# Patient Record
Sex: FEMALE | Race: WHITE | NOT HISPANIC OR LATINO | URBAN - METROPOLITAN AREA
[De-identification: names, ages, dates, MRNs, and addresses within clinical notes are randomized per-mention and may not be internally consistent; named-entity substitution may affect disease eponyms.]

---

## 2018-07-26 ENCOUNTER — OFFICE (OUTPATIENT)
Dept: URBAN - METROPOLITAN AREA CLINIC 101 | Facility: CLINIC | Age: 78
End: 2018-07-26

## 2018-07-26 VITALS
HEART RATE: 61 BPM | TEMPERATURE: 97.9 F | HEIGHT: 63 IN | WEIGHT: 179 LBS | DIASTOLIC BLOOD PRESSURE: 55 MMHG | SYSTOLIC BLOOD PRESSURE: 118 MMHG

## 2018-07-26 DIAGNOSIS — K59.1 FUNCTIONAL DIARRHEA: ICD-10-CM

## 2018-07-26 DIAGNOSIS — R63.3 FEEDING DIFFICULTIES: ICD-10-CM

## 2018-07-26 DIAGNOSIS — K94.23 GASTROSTOMY MALFUNCTION: ICD-10-CM

## 2018-07-26 PROCEDURE — 99214 OFFICE O/P EST MOD 30 MIN: CPT

## 2018-07-26 RX ORDER — DIPHENOXYLATE HYDROCHLORIDE AND ATROPINE SULFATE 2.5; .025 MG/1; MG/1
TABLET ORAL
Qty: 45 | Refills: 1 | Status: COMPLETED
Start: 2018-07-26 | End: 2018-10-23

## 2018-08-28 ENCOUNTER — OFFICE (OUTPATIENT)
Dept: URBAN - METROPOLITAN AREA CLINIC 33 | Facility: CLINIC | Age: 78
End: 2018-08-28

## 2018-08-28 VITALS
DIASTOLIC BLOOD PRESSURE: 49 MMHG | HEART RATE: 80 BPM | HEIGHT: 63 IN | TEMPERATURE: 97.7 F | SYSTOLIC BLOOD PRESSURE: 100 MMHG | WEIGHT: 172 LBS

## 2018-08-28 DIAGNOSIS — K59.1 FUNCTIONAL DIARRHEA: ICD-10-CM

## 2018-08-28 PROCEDURE — 99213 OFFICE O/P EST LOW 20 MIN: CPT

## 2018-10-23 ENCOUNTER — OFFICE (OUTPATIENT)
Dept: URBAN - METROPOLITAN AREA CLINIC 33 | Facility: CLINIC | Age: 78
End: 2018-10-23

## 2018-10-23 VITALS
WEIGHT: 166 LBS | HEART RATE: 83 BPM | HEIGHT: 63 IN | SYSTOLIC BLOOD PRESSURE: 98 MMHG | DIASTOLIC BLOOD PRESSURE: 47 MMHG | TEMPERATURE: 97.5 F

## 2018-10-23 DIAGNOSIS — K74.60 UNSPECIFIED CIRRHOSIS OF LIVER: ICD-10-CM

## 2018-10-23 DIAGNOSIS — D69.6 THROMBOCYTOPENIA, UNSPECIFIED: ICD-10-CM

## 2018-10-23 DIAGNOSIS — D64.9 ANEMIA, UNSPECIFIED: ICD-10-CM

## 2018-10-23 DIAGNOSIS — K72.90 HEPATIC FAILURE, UNSPECIFIED WITHOUT COMA: ICD-10-CM

## 2018-10-23 DIAGNOSIS — K92.1 MELENA: ICD-10-CM

## 2018-10-23 DIAGNOSIS — R18.8 OTHER ASCITES: ICD-10-CM

## 2018-10-23 DIAGNOSIS — K76.0 FATTY (CHANGE OF) LIVER, NOT ELSEWHERE CLASSIFIED: ICD-10-CM

## 2018-10-23 PROCEDURE — 99214 OFFICE O/P EST MOD 30 MIN: CPT

## 2018-11-14 LAB
COMPREHENSIVE METABOLIC PROF: ALANINE AMINOTRANS (ALT/GPT): 30 IU/L
COMPREHENSIVE METABOLIC PROF: ALBUMIN: 3.1 GM/DL — LOW
COMPREHENSIVE METABOLIC PROF: ALK PHOS,TOTAL: 127 IU/L
COMPREHENSIVE METABOLIC PROF: ANION GAP: 11
COMPREHENSIVE METABOLIC PROF: AST/SGOT ASPARTATE AMINO TRANS: 37 U/L
COMPREHENSIVE METABOLIC PROF: BILIRUBIN,TOTAL: 1.4 MG/DL — HIGH
COMPREHENSIVE METABOLIC PROF: BLOOD UREA NITROGEN: 25 MG/DL — HIGH
COMPREHENSIVE METABOLIC PROF: CALCIUM LEVEL: 8.5 MG/DL
COMPREHENSIVE METABOLIC PROF: CARBON DIOXIDE: 27 MEQ/L
COMPREHENSIVE METABOLIC PROF: CHLORIDE LEVEL: 104 MEQ/L
COMPREHENSIVE METABOLIC PROF: CREATININE,SERUM: 1.2 MG/DL
COMPREHENSIVE METABOLIC PROF: GLOMERULAR FILTRATION RATE: 43 ML/MIN
COMPREHENSIVE METABOLIC PROF: GLUCOSE,RANDOM: 156 MG/DL — HIGH
COMPREHENSIVE METABOLIC PROF: PERFORMING LOCATION: (no result)
COMPREHENSIVE METABOLIC PROF: POTASSIUM LEVEL: 3.6 MEQ/L
COMPREHENSIVE METABOLIC PROF: SODIUM LEVEL, SERUM: 142 MEQ/L
COMPREHENSIVE METABOLIC PROF: TOTAL PROTEIN: 7.1 GM/DL
PROTHROMBIN TIME  PT/ INR: INR: 1.25
PROTHROMBIN TIME  PT/ INR: PROTHROMBIN TIME (PATIENT): 13 SECONDS — HIGH

## 2018-12-06 ENCOUNTER — ON CAMPUS - OUTPATIENT (OUTPATIENT)
Dept: URBAN - METROPOLITAN AREA HOSPITAL 14 | Facility: HOSPITAL | Age: 78
End: 2018-12-06

## 2018-12-06 DIAGNOSIS — I85.00 ESOPHAGEAL VARICES WITHOUT BLEEDING: ICD-10-CM

## 2018-12-06 DIAGNOSIS — K74.60 UNSPECIFIED CIRRHOSIS OF LIVER: ICD-10-CM

## 2018-12-06 DIAGNOSIS — D64.9 ANEMIA, UNSPECIFIED: ICD-10-CM

## 2018-12-06 DIAGNOSIS — K76.6 PORTAL HYPERTENSION: ICD-10-CM

## 2018-12-06 DIAGNOSIS — K92.1 MELENA: ICD-10-CM

## 2018-12-06 PROCEDURE — 43235 EGD DIAGNOSTIC BRUSH WASH: CPT

## 2018-12-14 PROBLEM — K29.01 GASTRITIS: Status: ACTIVE | Noted: 2018-12-13

## 2019-01-31 ENCOUNTER — ON CAMPUS - OUTPATIENT (OUTPATIENT)
Dept: URBAN - METROPOLITAN AREA HOSPITAL 35 | Facility: HOSPITAL | Age: 79
End: 2019-01-31

## 2019-01-31 DIAGNOSIS — K75.81 NONALCOHOLIC STEATOHEPATITIS (NASH): ICD-10-CM

## 2019-01-31 DIAGNOSIS — I85.00 ESOPHAGEAL VARICES WITHOUT BLEEDING: ICD-10-CM

## 2019-01-31 DIAGNOSIS — K76.6 PORTAL HYPERTENSION: ICD-10-CM

## 2019-01-31 DIAGNOSIS — K74.60 UNSPECIFIED CIRRHOSIS OF LIVER: ICD-10-CM

## 2019-01-31 PROCEDURE — 43244 EGD VARICES LIGATION: CPT

## 2019-05-10 ENCOUNTER — OFFICE (OUTPATIENT)
Dept: URBAN - METROPOLITAN AREA CLINIC 101 | Facility: CLINIC | Age: 79
End: 2019-05-10

## 2019-05-10 VITALS
DIASTOLIC BLOOD PRESSURE: 64 MMHG | HEART RATE: 73 BPM | HEIGHT: 63 IN | TEMPERATURE: 97.7 F | WEIGHT: 158 LBS | SYSTOLIC BLOOD PRESSURE: 113 MMHG

## 2019-05-10 DIAGNOSIS — K74.60 UNSPECIFIED CIRRHOSIS OF LIVER: ICD-10-CM

## 2019-05-10 DIAGNOSIS — C22.0 LIVER CELL CARCINOMA: ICD-10-CM

## 2019-05-10 DIAGNOSIS — R18.8 OTHER ASCITES: ICD-10-CM

## 2019-05-10 DIAGNOSIS — I85.00 ESOPHAGEAL VARICES WITHOUT BLEEDING: ICD-10-CM

## 2019-05-10 DIAGNOSIS — D64.9 ANEMIA, UNSPECIFIED: ICD-10-CM

## 2019-05-10 PROCEDURE — 99215 OFFICE O/P EST HI 40 MIN: CPT

## 2019-05-10 NOTE — SERVICEHPINOTES
BARBARA HUME   is a   78  female with history of NIEVES, cirrhosis of liver and HCC presents with bloating symptoms. Previously seen by Dr. Maria. Seen by liver transplant committee at [facility], not a liver transplant candidate due to end stage disease not amendable to correction. S/P TACE on 03/14/19.BRHad a blood transfusion on 03/15/19 with hx of anemia.  Will follow up with [facility] on 05/17/19. BRHad and EGD 01/31/19 by Dr. Maria. Esophageal varies were seen s/p banding, portal hypertension.  Recommended repeat EGD in 4-6 weeks.  BRHx of AS, had a procedure, sounds like valve dilation in Feb prior to the TACE. Currently being monitored  by Dr. keene, cardiologist.  Reports increased bloating. Has been taking Lasix 40 mg and spironolactone 50 mg. BR+ Weight gain 10 lbs since 6 weeks. No previous parenthesis. BRMostly moves bowel up to 3 times daily on BSS type 6. Takes lactulose once daily in AM, not very good with taking the following dosage. BRSees some blood after wipe occasionally. Hx of hemorrhoids. last colonoscopy was done in 04/2015, one adenomatous polyp, non bleeding AVM and hemorrhoids. She was advised to repeat it in 5 years. Mother had a hx of colon cancer at age 58. BR

## 2019-06-14 ENCOUNTER — OFFICE (OUTPATIENT)
Dept: URBAN - METROPOLITAN AREA CLINIC 101 | Facility: CLINIC | Age: 79
End: 2019-06-14

## 2019-06-14 VITALS
WEIGHT: 163 LBS | HEART RATE: 67 BPM | TEMPERATURE: 97.9 F | DIASTOLIC BLOOD PRESSURE: 57 MMHG | SYSTOLIC BLOOD PRESSURE: 132 MMHG | HEIGHT: 63 IN

## 2019-06-14 DIAGNOSIS — C22.0 LIVER CELL CARCINOMA: ICD-10-CM

## 2019-06-14 DIAGNOSIS — I85.00 ESOPHAGEAL VARICES WITHOUT BLEEDING: ICD-10-CM

## 2019-06-14 DIAGNOSIS — K74.60 UNSPECIFIED CIRRHOSIS OF LIVER: ICD-10-CM

## 2019-06-14 DIAGNOSIS — R19.7 DIARRHEA, UNSPECIFIED: ICD-10-CM

## 2019-06-14 DIAGNOSIS — R18.8 OTHER ASCITES: ICD-10-CM

## 2019-06-14 DIAGNOSIS — R06.00 DYSPNEA, UNSPECIFIED: ICD-10-CM

## 2019-06-14 PROCEDURE — 99215 OFFICE O/P EST HI 40 MIN: CPT

## 2019-06-14 NOTE — INTERFACERESULTNOTES
Test results mailed and also relayed to patient over phone and I also told her she can take 2 Imodium tablets for diarrhea. Patient stated that her diarrhea has improved a lot.

## 2019-06-14 NOTE — SERVICEHPINOTES
BARBARA HUME   is a   78  female with hx of HCC s/p TACE with underlying NIEVES, cirrhosis of liver, esophageal varices, aortic stenosis presents with diarrhea. Moves bowel up to 8 times on BSS type 7. + occasional nocturnal diarrhea. Denies recent antibiotic use or sick person contact. BRGetting medication through pillpack. Concerned about PPI use due to SE including diarrhea. Takes Lactulose once daily. BRBlood in stool after wipe which has gotten worse lately. Hx of hemorrhoids, occasional flare up. + discharge with odor. BRLast colonoscopy was done in 04/2015, TA, non bleeding AVM and internal hemorrhoids. BRDenies nausea, vomiting, acid reflux. Occasional epigsatric pain especially at night. Recent MRI ordered by [facility]. Not able to do with IV due to poor vein. Will follow up with [facility].  BRCurrently on spironolactone 50 mg BID daily. Used to take Lasix 40 mg but D/C'd at least 4 months ago. Weight gain 5 lbs since May. + SOB. BRTakes Coreg 3.125 mg BID. BREGD scheduled for July for esophageal varices. Lindsey have EKG in July by cardiologist, Dr. Iyer. Hx of dilation of heart valve prior to TACE in March 2014. CALLIE

## 2019-06-18 LAB — C DIFFICILE TOXIN GENE NAA: NEGATIVE

## 2019-09-13 ENCOUNTER — OFFICE (OUTPATIENT)
Dept: URBAN - METROPOLITAN AREA CLINIC 33 | Facility: CLINIC | Age: 79
End: 2019-09-13

## 2019-09-13 VITALS
TEMPERATURE: 97.4 F | HEART RATE: 74 BPM | DIASTOLIC BLOOD PRESSURE: 53 MMHG | SYSTOLIC BLOOD PRESSURE: 111 MMHG | HEIGHT: 63 IN | WEIGHT: 159 LBS

## 2019-09-13 DIAGNOSIS — N28.9 DISORDER OF KIDNEY AND URETER, UNSPECIFIED: ICD-10-CM

## 2019-09-13 DIAGNOSIS — K74.60 UNSPECIFIED CIRRHOSIS OF LIVER: ICD-10-CM

## 2019-09-13 DIAGNOSIS — I35.0 NONRHEUMATIC AORTIC (VALVE) STENOSIS: ICD-10-CM

## 2019-09-13 DIAGNOSIS — E11.9 TYPE 2 DIABETES MELLITUS WITHOUT COMPLICATIONS: ICD-10-CM

## 2019-09-13 DIAGNOSIS — D50.9 IRON DEFICIENCY ANEMIA, UNSPECIFIED: ICD-10-CM

## 2019-09-13 PROCEDURE — 99214 OFFICE O/P EST MOD 30 MIN: CPT

## 2019-10-08 LAB — AFP, SERUM, TUMOR MARKER: 2.9 NG/ML (ref 0–8.3)

## 2019-12-09 ENCOUNTER — ON CAMPUS - OUTPATIENT (OUTPATIENT)
Dept: URBAN - METROPOLITAN AREA HOSPITAL 35 | Facility: HOSPITAL | Age: 79
End: 2019-12-09

## 2019-12-09 DIAGNOSIS — K31.89 OTHER DISEASES OF STOMACH AND DUODENUM: ICD-10-CM

## 2019-12-09 DIAGNOSIS — K29.80 DUODENITIS WITHOUT BLEEDING: ICD-10-CM

## 2019-12-09 DIAGNOSIS — K20.8 OTHER ESOPHAGITIS: ICD-10-CM

## 2019-12-09 DIAGNOSIS — K76.6 PORTAL HYPERTENSION: ICD-10-CM

## 2019-12-09 DIAGNOSIS — I85.10 SECONDARY ESOPHAGEAL VARICES WITHOUT BLEEDING: ICD-10-CM

## 2019-12-09 PROCEDURE — 43244 EGD VARICES LIGATION: CPT

## 2020-01-02 ENCOUNTER — ON CAMPUS - OUTPATIENT (OUTPATIENT)
Dept: URBAN - METROPOLITAN AREA HOSPITAL 35 | Facility: HOSPITAL | Age: 80
End: 2020-01-02

## 2020-01-02 DIAGNOSIS — K76.6 PORTAL HYPERTENSION: ICD-10-CM

## 2020-01-02 DIAGNOSIS — K31.89 OTHER DISEASES OF STOMACH AND DUODENUM: ICD-10-CM

## 2020-01-02 DIAGNOSIS — K85.10 BILIARY ACUTE PANCREATITIS WITHOUT NECROSIS OR INFECTION: ICD-10-CM

## 2020-01-02 DIAGNOSIS — K21.0 GASTRO-ESOPHAGEAL REFLUX DISEASE WITH ESOPHAGITIS: ICD-10-CM

## 2020-01-02 PROCEDURE — 43235 EGD DIAGNOSTIC BRUSH WASH: CPT

## 2020-01-06 ENCOUNTER — INPATIENT HOSPITAL (OUTPATIENT)
Dept: URBAN - METROPOLITAN AREA HOSPITAL 32 | Facility: HOSPITAL | Age: 80
End: 2020-01-06

## 2020-01-06 DIAGNOSIS — K62.5 HEMORRHAGE OF ANUS AND RECTUM: ICD-10-CM

## 2020-01-06 DIAGNOSIS — D69.6 THROMBOCYTOPENIA, UNSPECIFIED: ICD-10-CM

## 2020-01-06 DIAGNOSIS — D50.9 IRON DEFICIENCY ANEMIA, UNSPECIFIED: ICD-10-CM

## 2020-01-06 DIAGNOSIS — K74.60 UNSPECIFIED CIRRHOSIS OF LIVER: ICD-10-CM

## 2020-01-06 PROCEDURE — 99222 1ST HOSP IP/OBS MODERATE 55: CPT

## 2020-01-07 ENCOUNTER — INPATIENT HOSPITAL (OUTPATIENT)
Dept: URBAN - METROPOLITAN AREA HOSPITAL 32 | Facility: HOSPITAL | Age: 80
End: 2020-01-07
Payer: COMMERCIAL

## 2020-01-07 DIAGNOSIS — K55.21 ANGIODYSPLASIA OF COLON WITH HEMORRHAGE: ICD-10-CM

## 2020-01-07 DIAGNOSIS — K92.2 GASTROINTESTINAL HEMORRHAGE, UNSPECIFIED: ICD-10-CM

## 2020-01-07 DIAGNOSIS — K62.5 HEMORRHAGE OF ANUS AND RECTUM: ICD-10-CM

## 2020-01-07 DIAGNOSIS — D12.2 BENIGN NEOPLASM OF ASCENDING COLON: ICD-10-CM

## 2020-01-07 DIAGNOSIS — D69.6 THROMBOCYTOPENIA, UNSPECIFIED: ICD-10-CM

## 2020-01-07 DIAGNOSIS — D50.9 IRON DEFICIENCY ANEMIA, UNSPECIFIED: ICD-10-CM

## 2020-01-07 DIAGNOSIS — K74.60 UNSPECIFIED CIRRHOSIS OF LIVER: ICD-10-CM

## 2020-01-07 PROCEDURE — 45388 COLONOSCOPY W/ABLATION: CPT

## 2020-01-07 PROCEDURE — 45385 COLONOSCOPY W/LESION REMOVAL: CPT

## 2020-01-08 ENCOUNTER — INPATIENT HOSPITAL (OUTPATIENT)
Dept: URBAN - METROPOLITAN AREA HOSPITAL 32 | Facility: HOSPITAL | Age: 80
End: 2020-01-08

## 2020-01-08 DIAGNOSIS — D69.6 THROMBOCYTOPENIA, UNSPECIFIED: ICD-10-CM

## 2020-01-08 DIAGNOSIS — K92.2 GASTROINTESTINAL HEMORRHAGE, UNSPECIFIED: ICD-10-CM

## 2020-01-08 DIAGNOSIS — K55.21 ANGIODYSPLASIA OF COLON WITH HEMORRHAGE: ICD-10-CM

## 2020-01-08 DIAGNOSIS — D12.2 BENIGN NEOPLASM OF ASCENDING COLON: ICD-10-CM

## 2020-01-08 DIAGNOSIS — K62.5 HEMORRHAGE OF ANUS AND RECTUM: ICD-10-CM

## 2020-01-08 DIAGNOSIS — K74.60 UNSPECIFIED CIRRHOSIS OF LIVER: ICD-10-CM

## 2020-01-08 DIAGNOSIS — D50.9 IRON DEFICIENCY ANEMIA, UNSPECIFIED: ICD-10-CM

## 2020-01-08 PROCEDURE — 99232 SBSQ HOSP IP/OBS MODERATE 35: CPT

## 2020-01-27 ENCOUNTER — OFFICE (OUTPATIENT)
Dept: URBAN - METROPOLITAN AREA CLINIC 101 | Facility: CLINIC | Age: 80
End: 2020-01-27

## 2020-01-27 VITALS
HEIGHT: 63 IN | TEMPERATURE: 97.1 F | SYSTOLIC BLOOD PRESSURE: 142 MMHG | DIASTOLIC BLOOD PRESSURE: 56 MMHG | HEART RATE: 73 BPM | WEIGHT: 157 LBS

## 2020-01-27 DIAGNOSIS — K74.60 UNSPECIFIED CIRRHOSIS OF LIVER: ICD-10-CM

## 2020-01-27 DIAGNOSIS — K72.90 HEPATIC FAILURE, UNSPECIFIED WITHOUT COMA: ICD-10-CM

## 2020-01-27 DIAGNOSIS — Z86.010 PERSONAL HISTORY OF COLONIC POLYPS: ICD-10-CM

## 2020-01-27 DIAGNOSIS — K59.1 FUNCTIONAL DIARRHEA: ICD-10-CM

## 2020-01-27 DIAGNOSIS — I85.00 ESOPHAGEAL VARICES WITHOUT BLEEDING: ICD-10-CM

## 2020-01-27 DIAGNOSIS — C22.0 LIVER CELL CARCINOMA: ICD-10-CM

## 2020-01-27 PROCEDURE — 99215 OFFICE O/P EST HI 40 MIN: CPT

## 2020-01-27 NOTE — SERVICEHPINOTES
BARBARA HUME   is a   79  female who presents for follow up. Occasional nausea without vomiting. Denies dysphagia or acid reflux. Denies significant weight gain or loss.  BRBeen taking Nexium 20 mg daily since the last EGD on 01/02/20.  BROccasional burping. Occasional upper abdominal pain that radiates to the back. BRHas not been seen by [facility] since July, had an MRI in 08/2019. BROccasional trouble breathing, not sure if due to acid reflux.  BRMoves bowel up to 2-8 times daily. + chronic diarrhea. Occasional hemorrhoid flare up, seen a small amount of blood after wipe.     BRMost recent blood work from Dr. Dangelo, last week.Repeat EGD scheduled for Feb. S/p TAVR procedure in October by Dr. Lama. BRSees Dr. Iyer. BR

## 2020-01-29 LAB — PANCREATIC ELASTASE, FECAL: >500 UG ELAST./G

## 2020-02-18 ENCOUNTER — ON CAMPUS - OUTPATIENT (OUTPATIENT)
Dept: URBAN - METROPOLITAN AREA HOSPITAL 35 | Facility: HOSPITAL | Age: 80
End: 2020-02-18

## 2020-02-18 DIAGNOSIS — K76.6 PORTAL HYPERTENSION: ICD-10-CM

## 2020-02-18 DIAGNOSIS — K31.89 OTHER DISEASES OF STOMACH AND DUODENUM: ICD-10-CM

## 2020-02-18 DIAGNOSIS — I85.10 SECONDARY ESOPHAGEAL VARICES WITHOUT BLEEDING: ICD-10-CM

## 2020-02-18 PROCEDURE — 43244 EGD VARICES LIGATION: CPT

## 2020-06-16 ENCOUNTER — INPATIENT HOSPITAL (OUTPATIENT)
Dept: URBAN - METROPOLITAN AREA HOSPITAL 34 | Facility: HOSPITAL | Age: 80
End: 2020-06-16
Payer: COMMERCIAL

## 2020-06-16 DIAGNOSIS — K74.60 UNSPECIFIED CIRRHOSIS OF LIVER: ICD-10-CM

## 2020-06-16 DIAGNOSIS — K76.6 PORTAL HYPERTENSION: ICD-10-CM

## 2020-06-16 DIAGNOSIS — K92.1 MELENA: ICD-10-CM

## 2020-06-16 DIAGNOSIS — D50.9 IRON DEFICIENCY ANEMIA, UNSPECIFIED: ICD-10-CM

## 2020-06-16 DIAGNOSIS — K72.90 HEPATIC FAILURE, UNSPECIFIED WITHOUT COMA: ICD-10-CM

## 2020-06-16 DIAGNOSIS — D69.6 THROMBOCYTOPENIA, UNSPECIFIED: ICD-10-CM

## 2020-06-16 DIAGNOSIS — I85.10 SECONDARY ESOPHAGEAL VARICES WITHOUT BLEEDING: ICD-10-CM

## 2020-06-16 PROCEDURE — 99222 1ST HOSP IP/OBS MODERATE 55: CPT | Performed by: INTERNAL MEDICINE

## 2020-06-17 PROCEDURE — 43235 EGD DIAGNOSTIC BRUSH WASH: CPT | Performed by: INTERNAL MEDICINE

## 2020-06-18 ENCOUNTER — INPATIENT HOSPITAL (OUTPATIENT)
Dept: URBAN - METROPOLITAN AREA HOSPITAL 34 | Facility: HOSPITAL | Age: 80
End: 2020-06-18
Payer: COMMERCIAL

## 2020-06-18 DIAGNOSIS — K76.6 PORTAL HYPERTENSION: ICD-10-CM

## 2020-06-18 DIAGNOSIS — I85.10 SECONDARY ESOPHAGEAL VARICES WITHOUT BLEEDING: ICD-10-CM

## 2020-06-18 DIAGNOSIS — K72.90 HEPATIC FAILURE, UNSPECIFIED WITHOUT COMA: ICD-10-CM

## 2020-06-18 DIAGNOSIS — D50.9 IRON DEFICIENCY ANEMIA, UNSPECIFIED: ICD-10-CM

## 2020-06-18 DIAGNOSIS — K74.60 UNSPECIFIED CIRRHOSIS OF LIVER: ICD-10-CM

## 2020-06-18 DIAGNOSIS — K92.1 MELENA: ICD-10-CM

## 2020-06-18 DIAGNOSIS — D69.6 THROMBOCYTOPENIA, UNSPECIFIED: ICD-10-CM

## 2020-06-18 PROCEDURE — 99232 SBSQ HOSP IP/OBS MODERATE 35: CPT | Performed by: NURSE PRACTITIONER

## 2020-08-07 ENCOUNTER — OFFICE (OUTPATIENT)
Dept: URBAN - METROPOLITAN AREA TELEHEALTH 12 | Facility: TELEHEALTH | Age: 80
End: 2020-08-07
Payer: COMMERCIAL

## 2020-08-07 VITALS — WEIGHT: 146 LBS | HEIGHT: 63 IN

## 2020-08-07 DIAGNOSIS — K74.60 UNSPECIFIED CIRRHOSIS OF LIVER: ICD-10-CM

## 2020-08-07 DIAGNOSIS — I85.00 ESOPHAGEAL VARICES WITHOUT BLEEDING: ICD-10-CM

## 2020-08-07 DIAGNOSIS — K59.1 FUNCTIONAL DIARRHEA: ICD-10-CM

## 2020-08-07 PROCEDURE — 99214 OFFICE O/P EST MOD 30 MIN: CPT | Mod: 95 | Performed by: INTERNAL MEDICINE

## 2020-08-07 NOTE — SERVICEHPINOTES
PATIENT VERIFIED BY DATE OF BIRTH AND NAME. Patient has been consented for this telecommunication visit. 79 female who presents for follow up. Occasional nausea without vomiting. Denies dysphagia or acid reflux. Denies significant weight gain or loss. Been taking Nexium 20 mg daily.  EGD in June revealed Grade I non-bleeding varices, no banding necessary.  Some loose stools since, but this resolved which she stopped Metformin and cut back on Lactulose.BR Kristyn 10 point review of systems have been reviewed as per HPI and otherwise negative.

## 2020-08-07 NOTE — SERVICENOTES
Patient's visit was conducted through video telecommunication. Patient consented before the start of visit as to understanding of privacy concerns, possible technological failure, and their responsibility of carrying out instructions of plan.
No

## 2020-11-10 ENCOUNTER — OFFICE (OUTPATIENT)
Dept: URBAN - METROPOLITAN AREA CLINIC 102 | Facility: CLINIC | Age: 80
End: 2020-11-10
Payer: COMMERCIAL

## 2020-11-10 VITALS
DIASTOLIC BLOOD PRESSURE: 72 MMHG | HEART RATE: 73 BPM | WEIGHT: 152 LBS | HEIGHT: 63 IN | SYSTOLIC BLOOD PRESSURE: 127 MMHG | TEMPERATURE: 97.9 F

## 2020-11-10 DIAGNOSIS — K74.60 UNSPECIFIED CIRRHOSIS OF LIVER: ICD-10-CM

## 2020-11-10 DIAGNOSIS — I85.00 ESOPHAGEAL VARICES WITHOUT BLEEDING: ICD-10-CM

## 2020-11-10 PROCEDURE — 99214 OFFICE O/P EST MOD 30 MIN: CPT | Performed by: INTERNAL MEDICINE

## 2020-11-17 PROBLEM — I85.00 ESOPHAGEAL VARICES WITHOUT BLEEDING: Status: ACTIVE | Noted: 2019-05-10

## 2020-12-23 ENCOUNTER — INPATIENT HOSPITAL (OUTPATIENT)
Dept: URBAN - METROPOLITAN AREA HOSPITAL 34 | Facility: HOSPITAL | Age: 80
End: 2020-12-23
Payer: COMMERCIAL

## 2020-12-23 DIAGNOSIS — K74.60 UNSPECIFIED CIRRHOSIS OF LIVER: ICD-10-CM

## 2020-12-23 DIAGNOSIS — D50.9 IRON DEFICIENCY ANEMIA, UNSPECIFIED: ICD-10-CM

## 2020-12-23 DIAGNOSIS — I85.10 SECONDARY ESOPHAGEAL VARICES WITHOUT BLEEDING: ICD-10-CM

## 2020-12-23 DIAGNOSIS — D69.6 THROMBOCYTOPENIA, UNSPECIFIED: ICD-10-CM

## 2020-12-23 PROCEDURE — 99222 1ST HOSP IP/OBS MODERATE 55: CPT | Performed by: INTERNAL MEDICINE

## 2021-01-01 ENCOUNTER — OFFICE (OUTPATIENT)
Dept: URBAN - METROPOLITAN AREA TELEHEALTH 3 | Facility: TELEHEALTH | Age: 81
End: 2021-01-01
Payer: COMMERCIAL

## 2021-01-01 VITALS — HEIGHT: 63 IN | WEIGHT: 162 LBS

## 2021-01-01 DIAGNOSIS — K74.60 UNSPECIFIED CIRRHOSIS OF LIVER: ICD-10-CM

## 2021-01-01 DIAGNOSIS — R19.7 DIARRHEA, UNSPECIFIED: ICD-10-CM

## 2021-01-01 DIAGNOSIS — R18.8 OTHER ASCITES: ICD-10-CM

## 2021-01-01 PROCEDURE — 99442: CPT | Performed by: INTERNAL MEDICINE

## 2021-01-25 ENCOUNTER — OFFICE (OUTPATIENT)
Dept: URBAN - METROPOLITAN AREA TELEHEALTH 7 | Facility: TELEHEALTH | Age: 81
End: 2021-01-25
Payer: COMMERCIAL

## 2021-01-25 VITALS — HEIGHT: 63 IN | WEIGHT: 149 LBS

## 2021-01-25 DIAGNOSIS — K92.1 MELENA: ICD-10-CM

## 2021-01-25 DIAGNOSIS — C22.0 LIVER CELL CARCINOMA: ICD-10-CM

## 2021-01-25 DIAGNOSIS — D50.9 IRON DEFICIENCY ANEMIA, UNSPECIFIED: ICD-10-CM

## 2021-01-25 DIAGNOSIS — R18.8 OTHER ASCITES: ICD-10-CM

## 2021-01-25 DIAGNOSIS — K74.60 UNSPECIFIED CIRRHOSIS OF LIVER: ICD-10-CM

## 2021-01-25 PROCEDURE — 99442: CPT | Performed by: NURSE PRACTITIONER

## 2021-01-25 NOTE — SERVICEHPINOTES
PATIENT VERIFIED BY DATE OF BIRTH AND NAME. Patient has been consented for this telecommunication visit. PT visited ER in Dec with melena. Received RBC x 1. Acute on chronic anemia. BRDenies further melena however has shaking and dizziness. Has been taking iron supplement BID.BRWill have blood work in Feb for Dr. Dangelo. Denies nausea, vomiting or dysphagia.BRTakes Lactulose BID. Moves bowel up to 2-5 times daily. Denies blood in stool. Has loose BMs. BRAbdomen is not distended. Takes lasix 20 mg once daily.BRTakes pantoprazole 40 mg daily.Hx of heart attack 06/2018. BRS/p TAVR procedure in October 2019 by Dr. Lama. BRSees Dr. Iyer. BRBP this morning 134/46, HR 69. last night 123/42, HR 65 and yesterday morning /43.  ROS as stated above, all others negative.BR None Done

## 2021-02-17 LAB
CBC W/O DIFF: HEMATOCRIT: 30 % — LOW
CBC W/O DIFF: HEMOGLOBIN: 9.6 GM/DL — LOW
CBC W/O DIFF: MEAN CORPUSCULAR HEMOGLOBIN: 31 PG
CBC W/O DIFF: MEAN CORPUSCULAR HGB CONC: 32.4 G/DL — LOW
CBC W/O DIFF: MEAN CORPUSCULAR VOLUME: 95.5 FL
CBC W/O DIFF: MEAN PLATELET VOLUME: 11.5 FL
CBC W/O DIFF: NRBC ABSOLUTE COUNT: 0 K/MM3
CBC W/O DIFF: NRBC%: 0 /100WBC
CBC W/O DIFF: PLATELET COUNT: 34 K/MM3 — LOW
CBC W/O DIFF: RED BLOOD COUNT: 3.1 M/MM3 — LOW
CBC W/O DIFF: RED CELL DISTRIBUTION WIDTH #: 52.2 FL — HIGH
CBC W/O DIFF: RED CELL DISTRIBUTION WIDTH %: 14.8 %
CBC W/O DIFF: WHITE BLOOD COUNT: 3.5 K/MM3 — LOW
COMPREHENSIVE METABOLIC PROF: ALANINE AMINOTRANS (ALT/GPT): 69 IU/L
COMPREHENSIVE METABOLIC PROF: ALBUMIN: 3.3 GM/DL — LOW
COMPREHENSIVE METABOLIC PROF: ALK PHOS,TOTAL: 220 IU/L — HIGH
COMPREHENSIVE METABOLIC PROF: ANION GAP: 8
COMPREHENSIVE METABOLIC PROF: AST/SGOT ASPARTATE AMINO TRANS: 59 U/L — HIGH
COMPREHENSIVE METABOLIC PROF: BILIRUBIN,TOTAL: 1.8 MG/DL — HIGH
COMPREHENSIVE METABOLIC PROF: BLOOD UREA NITROGEN: 35 MG/DL — HIGH
COMPREHENSIVE METABOLIC PROF: CALCIUM LEVEL: 8.5 MG/DL
COMPREHENSIVE METABOLIC PROF: CARBON DIOXIDE: 24 MEQ/L
COMPREHENSIVE METABOLIC PROF: CHLORIDE LEVEL: 109 MEQ/L — HIGH
COMPREHENSIVE METABOLIC PROF: CREATININE,SERUM: 1.43 MG/DL — HIGH
COMPREHENSIVE METABOLIC PROF: GLOMERULAR FILTRATION RATE: 35 ML/MIN
COMPREHENSIVE METABOLIC PROF: GLUCOSE,RANDOM: 150 MG/DL — HIGH
COMPREHENSIVE METABOLIC PROF: POTASSIUM LEVEL: 3.9 MEQ/L
COMPREHENSIVE METABOLIC PROF: SODIUM LEVEL, SERUM: 141 MEQ/L
COMPREHENSIVE METABOLIC PROF: TOTAL PROTEIN: 7.3 GM/DL
Lab: (no result)
Lab: 17 %
Lab: 2.6 K/MM3
Lab: 5.2 %
Lab: 6 %
Lab: 76 %
Lab: NORMAL
Lab: NORMAL
PROTHROMBIN TIME  PT/ INR: INR: 1.18
PROTHROMBIN TIME  PT/ INR: PROTHROMBIN TIME (PATIENT): 12.5 SECONDS — HIGH

## 2021-02-18 LAB
AFP, SERUM, TUMOR MARKER - REF: 2.5 NG/ML
AFP, SERUM, TUMOR MARKER - REF: PERFORMING LOCATION: (no result)

## 2021-02-22 ENCOUNTER — INPATIENT HOSPITAL (OUTPATIENT)
Dept: URBAN - METROPOLITAN AREA HOSPITAL 34 | Facility: HOSPITAL | Age: 81
End: 2021-02-22
Payer: COMMERCIAL

## 2021-02-22 DIAGNOSIS — I85.10 SECONDARY ESOPHAGEAL VARICES WITHOUT BLEEDING: ICD-10-CM

## 2021-02-22 DIAGNOSIS — D69.6 THROMBOCYTOPENIA, UNSPECIFIED: ICD-10-CM

## 2021-02-22 DIAGNOSIS — D50.9 IRON DEFICIENCY ANEMIA, UNSPECIFIED: ICD-10-CM

## 2021-02-22 DIAGNOSIS — K72.90 HEPATIC FAILURE, UNSPECIFIED WITHOUT COMA: ICD-10-CM

## 2021-02-22 DIAGNOSIS — K74.60 UNSPECIFIED CIRRHOSIS OF LIVER: ICD-10-CM

## 2021-02-22 PROCEDURE — 99222 1ST HOSP IP/OBS MODERATE 55: CPT | Performed by: INTERNAL MEDICINE

## 2021-02-23 ENCOUNTER — INPATIENT HOSPITAL (OUTPATIENT)
Dept: URBAN - METROPOLITAN AREA HOSPITAL 34 | Facility: HOSPITAL | Age: 81
End: 2021-02-23
Payer: COMMERCIAL

## 2021-02-23 DIAGNOSIS — K72.90 HEPATIC FAILURE, UNSPECIFIED WITHOUT COMA: ICD-10-CM

## 2021-02-23 DIAGNOSIS — D69.6 THROMBOCYTOPENIA, UNSPECIFIED: ICD-10-CM

## 2021-02-23 DIAGNOSIS — D50.9 IRON DEFICIENCY ANEMIA, UNSPECIFIED: ICD-10-CM

## 2021-02-23 DIAGNOSIS — I85.10 SECONDARY ESOPHAGEAL VARICES WITHOUT BLEEDING: ICD-10-CM

## 2021-02-23 DIAGNOSIS — K74.60 UNSPECIFIED CIRRHOSIS OF LIVER: ICD-10-CM

## 2021-02-23 PROCEDURE — 99232 SBSQ HOSP IP/OBS MODERATE 35: CPT | Performed by: NURSE PRACTITIONER

## 2021-02-24 PROCEDURE — 99232 SBSQ HOSP IP/OBS MODERATE 35: CPT | Performed by: NURSE PRACTITIONER

## 2021-03-08 ENCOUNTER — OFFICE (OUTPATIENT)
Dept: URBAN - METROPOLITAN AREA CLINIC 102 | Facility: CLINIC | Age: 81
End: 2021-03-08
Payer: COMMERCIAL

## 2021-03-08 VITALS
HEIGHT: 63 IN | SYSTOLIC BLOOD PRESSURE: 127 MMHG | WEIGHT: 156 LBS | TEMPERATURE: 97.3 F | DIASTOLIC BLOOD PRESSURE: 58 MMHG | HEART RATE: 67 BPM

## 2021-03-08 DIAGNOSIS — D69.6 THROMBOCYTOPENIA, UNSPECIFIED: ICD-10-CM

## 2021-03-08 DIAGNOSIS — R18.8 OTHER ASCITES: ICD-10-CM

## 2021-03-08 DIAGNOSIS — K74.60 UNSPECIFIED CIRRHOSIS OF LIVER: ICD-10-CM

## 2021-03-08 DIAGNOSIS — K72.90 HEPATIC FAILURE, UNSPECIFIED WITHOUT COMA: ICD-10-CM

## 2021-03-08 DIAGNOSIS — I85.00 ESOPHAGEAL VARICES WITHOUT BLEEDING: ICD-10-CM

## 2021-03-08 DIAGNOSIS — K65.2 SPONTANEOUS BACTERIAL PERITONITIS: ICD-10-CM

## 2021-03-08 PROCEDURE — 99214 OFFICE O/P EST MOD 30 MIN: CPT | Performed by: PHYSICIAN ASSISTANT

## 2021-03-08 NOTE — SERVICEHPINOTES
Ms. Hume is here for f/u to recent hospitalization for HE and presumed SBP. She was admitted through the ED d/t confusion. Found to have elevated ammonia level. Per her brother, has been dx with HE since 2018. Lactulose was increased and Xifaxan was added.  She is an established patient at our practice for NIEVES cirrhosis. CT scan didn't show any liver mass. Recent AFP was negative. For presumed SBP she was started on Zosyn and then d/c home on Augmentin. She has not been taking Xifaxan due to cost. She has been following with [facility] for HCC underwent a TACE in 03/19 and two in 2020. Due for next follow up at [facility] tertiary care in May of 2021. Just had an MRI at [facility] recently. She has dark stools but spoke to Dr. Dangelo who believed it was due to the iron that she is taking. No nausea/vomiting.      Mental status is back to her baseline. Has mild discomfort which is fleeting around the umbilicus (mild) but no lasting or focal pain. No fever or chills (outside of having flu shot on 3/4/21). No asterixis 
MODERATE

## 2021-04-07 LAB
CBC W/DIFF: BASO #: 0 K/MM3
CBC W/DIFF: BASO %: 0.3 %
CBC W/DIFF: EOS #: 0.1 K/MM3
CBC W/DIFF: EOS %: 2.8 %
CBC W/DIFF: HEMATOCRIT: 28 % — LOW
CBC W/DIFF: HEMOGLOBIN: 9.4 GM/DL — LOW
CBC W/DIFF: IMMATURE GRANULOCYTES #: 0.01 K/MM3
CBC W/DIFF: IMMATURE GRANULOCYTES %: 0.3 %
CBC W/DIFF: LYMPH #: 0.5 K/MM3 — LOW
CBC W/DIFF: LYMPH %: 15.2 %
CBC W/DIFF: MEAN CORPUSCULAR HEMOGLOBIN: 31.2 PG
CBC W/DIFF: MEAN CORPUSCULAR HGB CONC: 34.1 G/DL
CBC W/DIFF: MEAN CORPUSCULAR VOLUME: 91.7 FL
CBC W/DIFF: MEAN PLATELET VOLUME: 11.3 FL
CBC W/DIFF: MONO #: 0.3 K/MM3
CBC W/DIFF: MONO %: 9.3 %
CBC W/DIFF: NEUTROPHILS #: 2.3 K/MM3 — LOW
CBC W/DIFF: NEUTROPHILS %: 72.1 %
CBC W/DIFF: NRBC ABSOLUTE COUNT: 0 K/MM3
CBC W/DIFF: NRBC%: 0 /100WBC
CBC W/DIFF: PLATELET COUNT: 26 K/MM3 — LOW
CBC W/DIFF: RED BLOOD COUNT: 3.01 M/MM3 — LOW
CBC W/DIFF: RED CELL DISTRIBUTION WIDTH #: 50 FL — HIGH
CBC W/DIFF: RED CELL DISTRIBUTION WIDTH %: 14.8 %
CBC W/DIFF: WHITE BLOOD COUNT: 3.2 K/MM3 — LOW
COMPREHENSIVE METABOLIC PROF: ALANINE AMINOTRANS (ALT/GPT): 63 IU/L
COMPREHENSIVE METABOLIC PROF: ALBUMIN: 3.3 GM/DL — LOW
COMPREHENSIVE METABOLIC PROF: ALK PHOS,TOTAL: 207 IU/L — HIGH
COMPREHENSIVE METABOLIC PROF: ANION GAP: 10
COMPREHENSIVE METABOLIC PROF: AST/SGOT ASPARTATE AMINO TRANS: 50 U/L — HIGH
COMPREHENSIVE METABOLIC PROF: BILIRUBIN,TOTAL: 1.5 MG/DL — HIGH
COMPREHENSIVE METABOLIC PROF: BLOOD UREA NITROGEN: 48 MG/DL — HIGH
COMPREHENSIVE METABOLIC PROF: CALCIUM LEVEL: 8.4 MG/DL — LOW
COMPREHENSIVE METABOLIC PROF: CARBON DIOXIDE: 22 MEQ/L
COMPREHENSIVE METABOLIC PROF: CHLORIDE LEVEL: 109 MEQ/L — HIGH
COMPREHENSIVE METABOLIC PROF: CREATININE,SERUM: 1.41 MG/DL — HIGH
COMPREHENSIVE METABOLIC PROF: GLOMERULAR FILTRATION RATE: 36 ML/MIN
COMPREHENSIVE METABOLIC PROF: GLUCOSE,RANDOM: 190 MG/DL — HIGH
COMPREHENSIVE METABOLIC PROF: POTASSIUM LEVEL: 4.1 MEQ/L
COMPREHENSIVE METABOLIC PROF: SODIUM LEVEL, SERUM: 141 MEQ/L
COMPREHENSIVE METABOLIC PROF: TOTAL PROTEIN: 7.1 GM/DL
FERRITIN: 53.4 NG/ML
FOLATE: 10.5 NG/ML
IRON   TIBC: CALC TOTAL IRON BINDING CAP: 305 MCG/DL
IRON   TIBC: IRON (FE): 71 MCG/DL
IRON   TIBC: PERFORMING LOCATION: (no result)
IRON   TIBC: SATURATION %: 23.2 %
IRON   TIBC: TRANSFERRIN: 218 MG/DL
Lab: 4.8 %
PROTHROMBIN TIME  PT/ INR: INR: 1.2
PROTHROMBIN TIME  PT/ INR: PERFORMING LOCATION: (no result)
PROTHROMBIN TIME  PT/ INR: PROTHROMBIN TIME (PATIENT): 12.7 SECONDS — HIGH
VITAMIN B12 LEVEL: 777 PG/ML

## 2021-04-12 ENCOUNTER — OFFICE (OUTPATIENT)
Dept: URBAN - METROPOLITAN AREA CLINIC 102 | Facility: CLINIC | Age: 81
End: 2021-04-12
Payer: COMMERCIAL

## 2021-04-12 VITALS
DIASTOLIC BLOOD PRESSURE: 81 MMHG | SYSTOLIC BLOOD PRESSURE: 130 MMHG | HEART RATE: 70 BPM | HEIGHT: 63 IN | TEMPERATURE: 97.9 F | WEIGHT: 154 LBS

## 2021-04-12 DIAGNOSIS — I85.00 ESOPHAGEAL VARICES WITHOUT BLEEDING: ICD-10-CM

## 2021-04-12 DIAGNOSIS — R18.8 OTHER ASCITES: ICD-10-CM

## 2021-04-12 DIAGNOSIS — K74.60 UNSPECIFIED CIRRHOSIS OF LIVER: ICD-10-CM

## 2021-04-12 PROCEDURE — 99214 OFFICE O/P EST MOD 30 MIN: CPT | Performed by: INTERNAL MEDICINE

## 2021-11-30 NOTE — SERVICEHPINOTES
PATIENT VERIFIED BY DATE OF BIRTH AND NAME. Patient has been consented for this telecommunication visit.   82 yo fm with weight gain.  Pt has cirrhosis and ascites in the past.  Pt c/o some LE edema and ascites at times.  Pt is having some diarrhea every day.  Is taking Lactulose - once per day.  Pt states is taking Lasix daily as well.  Pt denies any blood in her stool  Has gained about 10 lbs.  ROS o/w negative.